# Patient Record
Sex: MALE | Race: BLACK OR AFRICAN AMERICAN | NOT HISPANIC OR LATINO | URBAN - METROPOLITAN AREA
[De-identification: names, ages, dates, MRNs, and addresses within clinical notes are randomized per-mention and may not be internally consistent; named-entity substitution may affect disease eponyms.]

---

## 2024-08-27 ENCOUNTER — APPOINTMENT (OUTPATIENT)
Dept: PRIMARY CARE | Facility: CLINIC | Age: 24
End: 2024-08-27
Payer: COMMERCIAL

## 2024-08-27 VITALS
HEART RATE: 100 BPM | DIASTOLIC BLOOD PRESSURE: 80 MMHG | WEIGHT: 141 LBS | HEIGHT: 69 IN | SYSTOLIC BLOOD PRESSURE: 134 MMHG | BODY MASS INDEX: 20.88 KG/M2

## 2024-08-27 DIAGNOSIS — Z00.00 HEALTH CARE MAINTENANCE: ICD-10-CM

## 2024-08-27 DIAGNOSIS — Z23 NEED FOR HPV VACCINE: ICD-10-CM

## 2024-08-27 DIAGNOSIS — K59.00 CONSTIPATION, UNSPECIFIED CONSTIPATION TYPE: Primary | ICD-10-CM

## 2024-08-27 DIAGNOSIS — Z23 NEED FOR PROPHYLACTIC VACCINATION AGAINST DIPHTHERIA AND TETANUS: ICD-10-CM

## 2024-08-27 PROCEDURE — 90715 TDAP VACCINE 7 YRS/> IM: CPT | Performed by: INTERNAL MEDICINE

## 2024-08-27 PROCEDURE — 90472 IMMUNIZATION ADMIN EACH ADD: CPT | Performed by: INTERNAL MEDICINE

## 2024-08-27 PROCEDURE — 99203 OFFICE O/P NEW LOW 30 MIN: CPT | Performed by: INTERNAL MEDICINE

## 2024-08-27 PROCEDURE — 90651 9VHPV VACCINE 2/3 DOSE IM: CPT | Performed by: INTERNAL MEDICINE

## 2024-08-27 PROCEDURE — 1036F TOBACCO NON-USER: CPT | Performed by: INTERNAL MEDICINE

## 2024-08-27 PROCEDURE — 3008F BODY MASS INDEX DOCD: CPT | Performed by: INTERNAL MEDICINE

## 2024-08-27 PROCEDURE — 90471 IMMUNIZATION ADMIN: CPT | Performed by: INTERNAL MEDICINE

## 2024-08-27 RX ORDER — PSYLLIUM HUSK 0.4 G
5 CAPSULE ORAL 4 TIMES DAILY
Qty: 150 CAPSULE | Refills: 0 | Status: SHIPPED | OUTPATIENT
Start: 2024-08-27

## 2024-08-27 RX ORDER — SIMETHICONE 80 MG
80 TABLET,CHEWABLE ORAL EVERY 6 HOURS PRN
Qty: 30 TABLET | Refills: 0 | Status: SHIPPED | OUTPATIENT
Start: 2024-08-27 | End: 2024-09-06

## 2024-08-27 NOTE — PROGRESS NOTES
"Subjective   Patient ID: Pilar Cartagena is a 24 y.o. male who presents for Establish Care.    HPI     Patient is a 24-year-old male with no significant past medical history presents with chief complaint of abdominal discomfort over the last 2 months.  Mostly upper left side but sometimes radiates to the upper right side.  He reports gurgling of the stomach.  No pain.  No blood in stool.  No diarrhea or constipation.  He reports eating plenty of fiber  Again no abdominal pain.  It seems he had some symptoms of abdominal pain back in 2016 and 2013 and abdominal imaging showed some constipation as well as some bowel gas patterns.  Symptoms are not associated with any particular types of food.  He states that over the last 2 weeks his symptoms for the most part have resolved    Review of Systems  Constitutional: No fever or chills  Cardiovascular: no chest pain, no palpitations and no syncope.   Respiratory: no cough, no shortness of breath during exertion and no shortness of breath at rest.   Gastrointestinal: no abdominal pain, no nausea and no vomiting.  Neuro: No Headache, no dizziness    Objective   /80   Pulse 100   Ht 1.753 m (5' 9\")   Wt 64 kg (141 lb)   BMI 20.82 kg/m²     Physical Exam  Constitutional: Alert and in no acute distress. Well developed, well nourished  Head and Face: Head and face: Normal.    Cardiovascular: Heart rate and rhythm were normal, normal S1 and S2. No peripheral edema.   Pulmonary: No respiratory distress. Clear bilateral breath sounds.  Musculoskeletal: Gait and station: Normal. Muscle strength/tone: Normal.   Skin: Normal skin color and pigmentation, normal skin turgor, and no rash.    Psychiatric: Judgment and insight: Intact. Mood and affect: Normal.    Procedures    No results found for: \"WBC\", \"HGB\", \"HCT\", \"PLT\", \"CHOL\", \"TRIG\", \"HDL\", \"LDLDIRECT\", \"ALT\", \"AST\", \"NA\", \"K\", \"CL\", \"CREATININE\", \"BUN\", \"CO2\", \"TSH\", \"PSA\", \"INR\", \"GLUF\", \"HGBA1C\", \"ALBUR\"    ABDOMEN AP " VIEW  EXAMINATION:  ABDOMEN AP VIEW     CLINICAL HISTORY:  CONSTIPATION, ABDOMEN KUB  WET READING     FINDINGS:  Compared to the prior study of 9/12/2013, there remains a large   amount of stool overlying the colon and rectum.     Bowel gas pattern is nonobstructive.     Lung bases appear clear.        IMPRESSION:  Nonobstructive bowel gas pattern.            Assessment/Plan   Problem List Items Addressed This Visit    None  Visit Diagnoses         Codes    Health care maintenance    -  Primary Z00.00    Relevant Orders    CBC    Comprehensive metabolic panel    TSH with reflex to Free T4 if abnormal    Constipation, unspecified constipation type     K59.00    Relevant Medications    psyllium (MetamuciL) 0.4 gram capsule    simethicone (Gas Relief, simethicone,) 80 mg chewable tablet    Need for prophylactic vaccination against diphtheria and tetanus     Z23    Relevant Orders    Tdap vaccine, age 7 years and older (Completed)    Need for HPV vaccine     Z23    Relevant Orders    HPV 9-valent vaccine (GARDASIL 9) (Completed)          Patient with nonspecific abdominal symptoms.  Recommend low FODMAP diet.  Simethicone 80 mg 3 times a day as needed for excessive gases.  Monitor symptoms.  If symptoms do not improve can consider advanced imaging  Check screening labs